# Patient Record
Sex: FEMALE | URBAN - METROPOLITAN AREA
[De-identification: names, ages, dates, MRNs, and addresses within clinical notes are randomized per-mention and may not be internally consistent; named-entity substitution may affect disease eponyms.]

---

## 2021-08-10 ENCOUNTER — PROCEDURE (OUTPATIENT)
Dept: URBAN - METROPOLITAN AREA SURGERY 1 | Facility: SURGERY | Age: 66
End: 2021-08-10
Payer: COMMERCIAL

## 2021-09-30 ENCOUNTER — POST-OPERATIVE VISIT (OUTPATIENT)
Dept: URBAN - METROPOLITAN AREA CLINIC 1 | Facility: CLINIC | Age: 66
End: 2021-09-30
Payer: COMMERCIAL

## 2021-09-30 DIAGNOSIS — Z48.810 ENCOUNTER FOR SURGICAL AFTERCARE FOLLOWING SURGERY ON A SENSE ORGAN: Primary | ICD-10-CM

## 2021-09-30 PROCEDURE — 99024 POSTOP FOLLOW-UP VISIT: CPT | Performed by: OPHTHALMOLOGY

## 2021-09-30 ASSESSMENT — INTRAOCULAR PRESSURE
OS: 15
OD: 18

## 2021-09-30 NOTE — IMPRESSION/PLAN
Impression: S/P CE/Standard IOL OD - . Encounter for surgical aftercare following surgery on a sense organ  Z48.810. Plan: Recommend to continue use of lubricating eye drops and  OTC allergy eye drops. --Advised patient to use artificial tears for comfort.

## 2022-11-02 ENCOUNTER — OFFICE VISIT (OUTPATIENT)
Dept: URBAN - METROPOLITAN AREA CLINIC 1 | Facility: CLINIC | Age: 67
End: 2022-11-02
Payer: MEDICARE

## 2022-11-02 DIAGNOSIS — H04.123 DRY EYE SYNDROME OF BILATERAL LACRIMAL GLANDS: Primary | ICD-10-CM

## 2022-11-02 DIAGNOSIS — H47.233 GLAUCOMATOUS OPTIC ATROPHY, BILATERAL: ICD-10-CM

## 2022-11-02 DIAGNOSIS — Z96.1 PRESENCE OF INTRAOCULAR LENS: ICD-10-CM

## 2022-11-02 PROCEDURE — 99213 OFFICE O/P EST LOW 20 MIN: CPT | Performed by: OPHTHALMOLOGY

## 2022-11-02 ASSESSMENT — INTRAOCULAR PRESSURE
OD: 13
OS: 17

## 2022-11-02 NOTE — IMPRESSION/PLAN
Impression: Glaucomatous optic atrophy, bilateral: H47.233. Plan: Patient with stable ONC OU and no evidence of glaucomatous progression. No treatment needed at this time. We will continue to observe closely. 

RV 1 MONTH VF THEN LCT

## 2022-12-02 ENCOUNTER — TESTING ONLY (OUTPATIENT)
Dept: URBAN - METROPOLITAN AREA CLINIC 1 | Facility: CLINIC | Age: 67
End: 2022-12-02
Payer: MEDICARE

## 2022-12-02 DIAGNOSIS — H47.233 GLAUCOMATOUS OPTIC ATROPHY, BILATERAL: Primary | ICD-10-CM

## 2023-01-04 ENCOUNTER — Encounter (OUTPATIENT)
Dept: URBAN - METROPOLITAN AREA CLINIC 1 | Facility: CLINIC | Age: 68
End: 2023-01-04